# Patient Record
Sex: FEMALE | URBAN - METROPOLITAN AREA
[De-identification: names, ages, dates, MRNs, and addresses within clinical notes are randomized per-mention and may not be internally consistent; named-entity substitution may affect disease eponyms.]

---

## 2017-05-30 ENCOUNTER — SNF ADMIT/H&P (OUTPATIENT)
Dept: FAMILY MEDICINE CLINIC | Facility: CLINIC | Age: 82
End: 2017-05-30

## 2017-05-30 DIAGNOSIS — Z95.1 HX OF CABG: ICD-10-CM

## 2017-05-30 DIAGNOSIS — I21.4 NSTEMI (NON-ST ELEVATED MYOCARDIAL INFARCTION) (HCC): ICD-10-CM

## 2017-05-30 DIAGNOSIS — E78.00 PURE HYPERCHOLESTEROLEMIA: ICD-10-CM

## 2017-05-30 DIAGNOSIS — I25.119 CORONARY ARTERY DISEASE INVOLVING NATIVE HEART WITH ANGINA PECTORIS, UNSPECIFIED VESSEL OR LESION TYPE (HCC): ICD-10-CM

## 2017-05-30 DIAGNOSIS — J44.1 COPD EXACERBATION (HCC): Primary | ICD-10-CM

## 2017-05-30 DIAGNOSIS — E11.59 TYPE 2 DIABETES MELLITUS WITH OTHER CIRCULATORY COMPLICATION, WITHOUT LONG-TERM CURRENT USE OF INSULIN (HCC): ICD-10-CM

## 2017-05-30 DIAGNOSIS — M54.50 CHRONIC LOW BACK PAIN WITHOUT SCIATICA, UNSPECIFIED BACK PAIN LATERALITY: ICD-10-CM

## 2017-05-30 DIAGNOSIS — G89.29 CHRONIC LOW BACK PAIN WITHOUT SCIATICA, UNSPECIFIED BACK PAIN LATERALITY: ICD-10-CM

## 2017-05-30 DIAGNOSIS — I10 ESSENTIAL HYPERTENSION: ICD-10-CM

## 2017-05-30 PROCEDURE — 99306 1ST NF CARE HIGH MDM 50: CPT | Performed by: FAMILY MEDICINE

## 2017-06-01 VITALS
DIASTOLIC BLOOD PRESSURE: 83 MMHG | TEMPERATURE: 97 F | SYSTOLIC BLOOD PRESSURE: 109 MMHG | WEIGHT: 190 LBS | HEART RATE: 97 BPM | OXYGEN SATURATION: 97 % | RESPIRATION RATE: 16 BRPM

## 2017-06-01 RX ORDER — ANTACID TABLETS 500 MG/1
1 TABLET, CHEWABLE ORAL DAILY
Qty: 30 TABLET | Refills: 0 | COMMUNITY
Start: 2017-06-01 | End: 2017-07-01

## 2017-06-01 RX ORDER — ASPIRIN 81 MG
100 TABLET, DELAYED RELEASE (ENTERIC COATED) ORAL 2 TIMES DAILY
Qty: 60 TABLET | Refills: 0 | COMMUNITY
Start: 2017-06-01 | End: 2020-08-03

## 2017-06-01 RX ORDER — CITALOPRAM 20 MG/1
10 TABLET ORAL DAILY
Qty: 30 TABLET | Refills: 0 | COMMUNITY
Start: 2017-06-01

## 2017-06-01 RX ORDER — CEPHALEXIN 500 MG/1
500 CAPSULE ORAL 3 TIMES DAILY
Qty: 15 CAPSULE | Refills: 0 | COMMUNITY
Start: 2017-06-01 | End: 2019-05-14 | Stop reason: ALTCHOICE

## 2017-06-01 RX ORDER — IPRATROPIUM BROMIDE AND ALBUTEROL SULFATE 2.5; .5 MG/3ML; MG/3ML
3 SOLUTION RESPIRATORY (INHALATION) EVERY 6 HOURS PRN
Qty: 1 CONTAINER | Refills: 0 | COMMUNITY
Start: 2017-06-01 | End: 2020-08-03

## 2017-06-01 RX ORDER — CLOPIDOGREL BISULFATE 75 MG/1
75 TABLET ORAL DAILY
Qty: 30 TABLET | Refills: 0 | COMMUNITY
Start: 2017-06-01

## 2017-06-01 RX ORDER — LOSARTAN POTASSIUM 25 MG/1
25 TABLET ORAL DAILY
Qty: 30 TABLET | Refills: 0 | COMMUNITY
Start: 2017-06-01 | End: 2019-05-14

## 2017-06-01 RX ORDER — ROPINIROLE 1 MG/1
1 TABLET, FILM COATED ORAL 2 TIMES DAILY
Qty: 60 TABLET | Refills: 0 | Status: SHIPPED | OUTPATIENT
Start: 2017-06-01

## 2017-06-01 RX ORDER — PANTOPRAZOLE SODIUM 40 MG/1
40 TABLET, DELAYED RELEASE ORAL
Qty: 30 TABLET | Refills: 0 | COMMUNITY
Start: 2017-06-01

## 2017-06-01 RX ORDER — HYDROXYZINE HYDROCHLORIDE 25 MG/1
25 TABLET, FILM COATED ORAL EVERY 8 HOURS PRN
Qty: 30 TABLET | Refills: 0 | COMMUNITY
Start: 2017-06-01

## 2017-06-02 NOTE — PROGRESS NOTES
Patient was seen today in follow-up for her COPD. She states she is feeling well and that the nurses stated that she did not need to use oxygen at this time.   However during assessment she was 92% on room air sitting at rest when she gets up to walk she d and agreed to a trial of Restoril 7.5 mg nightly as needed. Will reevaluate that on Monday.     Benja Eaton, 06/02/2017, 3:17 PM

## 2017-06-05 ENCOUNTER — SNF VISIT (OUTPATIENT)
Dept: INTERNAL MEDICINE CLINIC | Age: 82
End: 2017-06-05

## 2017-06-05 VITALS
OXYGEN SATURATION: 94 % | SYSTOLIC BLOOD PRESSURE: 120 MMHG | HEART RATE: 80 BPM | DIASTOLIC BLOOD PRESSURE: 59 MMHG | RESPIRATION RATE: 18 BRPM | TEMPERATURE: 98 F

## 2017-06-05 DIAGNOSIS — J44.1 CHRONIC OBSTRUCTIVE PULMONARY DISEASE WITH ACUTE EXACERBATION (HCC): Primary | ICD-10-CM

## 2017-06-05 DIAGNOSIS — R53.81 PHYSICAL DECONDITIONING: ICD-10-CM

## 2017-06-05 PROCEDURE — 99309 SBSQ NF CARE MODERATE MDM 30: CPT | Performed by: NURSE PRACTITIONER

## 2017-06-05 NOTE — PROGRESS NOTES
Patient was seen today in follow-up for her COPD. Requires constant reminders in order to wear her oxygen while she has been walking. Otherwise she states she feels well no shortness of breath no dyspnea no chest pain pressure.   She states she is sleepin

## 2017-06-07 NOTE — PROGRESS NOTES
Olga Lares, 7/17/1935, [de-identified]year old, female is being discharged from Facility: [address]    Date of Admission: 05/31/2017    Date of Discharge:06/8/2017                             Admitting Diagnoses: COPD, diabetes, JAZZY, In hyperlipidemia with an elevated cholesterol and would like to start her on a statin.   She however declines use of a statin stating that it has caused severe caused severe musculoskeletal pain for her in the past and  she is working with her cardiologist in

## 2017-06-08 NOTE — PROGRESS NOTES
Garcia Senior  : 1935  Age [de-identified]year old  female patient is admitted to [address] [place] [address] for weakness, COPD    Chief complaint: shortness of breath    HPI  [de-identified] yo female with PMHx of CAD s/p CABG, COPD, JAZZY, DMII, HTN, HLD 3 (three) times daily. , Disp: 15 capsule, Rfl: 0  MetFORMIN HCl 500 MG Oral Tab, Take 1 tablet (500 mg total) by mouth 2 (two) times daily. , Disp: 180 tablet, Rfl: 3  metoprolol Tartrate 25 MG Oral Tab, Take 0.5 tablets (12.5 mg total) by mouth 2 (two) susanna rivaroxaban 10 MG Oral Tab, Take 10 mg by mouth daily with food. , Disp: , Rfl:   ondansetron 4 MG Oral Tablet Dispersible, Take 4 mg by mouth every 8 (eight) hours as needed for Nausea., Disp: , Rfl:         VITALS:  /83 mmHg  Pulse 97  Temp(Src) 9 appropriate      MEDICAL DECISION MAKING    [de-identified] yo female with PMHx of CAD s/p CABG, COPD, JAZZY, DMII, HTN, HLD, lumbar back pain s/p minimally invasive spinal surgery. She had back pain and then had minimally invasive spinal surgery.   The next day she deve

## 2018-02-16 ENCOUNTER — CHARTING TRANS (OUTPATIENT)
Dept: OTHER | Age: 83
End: 2018-02-16

## 2018-02-19 ENCOUNTER — CHARTING TRANS (OUTPATIENT)
Dept: OTHER | Age: 83
End: 2018-02-19

## 2018-02-19 ENCOUNTER — LAB SERVICES (OUTPATIENT)
Dept: OTHER | Age: 83
End: 2018-02-19

## 2018-02-19 ENCOUNTER — IMAGING SERVICES (OUTPATIENT)
Dept: OTHER | Age: 83
End: 2018-02-19

## 2018-02-19 LAB
C3 SERPL-MCNC: 139 MG/DL (ref 88–165)
C4 SERPL-MCNC: 34.5 MG/DL (ref 14–44)
CRP SERPL-MCNC: 0.9 MG/DL (ref 0–1)
RHEUMATOID FACT SERPL-ACNC: <8.6 [IU]/ML
SEDIMENTATION RATE, RBC: 17 MM/H (ref 0–20)

## 2018-02-20 LAB
ANA SER QL IA: NORMAL RATIO (ref 0–0.6)
DSDNA IGG SERPL IA-ACNC: NORMAL [IU]/ML (ref 0–10)

## 2018-03-19 ENCOUNTER — CHARTING TRANS (OUTPATIENT)
Dept: OTHER | Age: 83
End: 2018-03-19

## 2018-11-23 ENCOUNTER — IMAGING SERVICES (OUTPATIENT)
Dept: OTHER | Age: 83
End: 2018-11-23

## 2019-02-09 ENCOUNTER — EXTERNAL RECORD (OUTPATIENT)
Dept: OTHER | Age: 84
End: 2019-02-09

## 2019-03-06 VITALS
DIASTOLIC BLOOD PRESSURE: 80 MMHG | WEIGHT: 184 LBS | BODY MASS INDEX: 29.57 KG/M2 | SYSTOLIC BLOOD PRESSURE: 120 MMHG | HEART RATE: 66 BPM | HEIGHT: 66 IN | RESPIRATION RATE: 18 BRPM

## 2019-03-06 VITALS
BODY MASS INDEX: 30.53 KG/M2 | RESPIRATION RATE: 16 BRPM | SYSTOLIC BLOOD PRESSURE: 118 MMHG | HEART RATE: 68 BPM | DIASTOLIC BLOOD PRESSURE: 70 MMHG | WEIGHT: 190 LBS | HEIGHT: 66 IN

## 2019-03-15 LAB
ANION GAP: 9
BASOPHILS: 0.1 X10
BASOPHILS: 1 %
BUN/CREATININE RATIO: 20
BUN: 16 MG/DL
CALCIUM: 10.1 MG/DL
CHLORIDE: 106 MMOL/L
CO2: 23 MMOL/L
CREATININE: 0.8 MG/DL
EGFR AFRICAN-AMERICAN: 79.6
EOSINOPHILS: 0.2 X10
GLUCOSE: 72 MG/DL
HEMATOCRIT: 36.5 %
HEMOGLOBIN: 12.1 G/DL
INR (RUSH): 1
LYMPHOCYTES: 3.3 X10
MCH: 29.4 PG
MCHC: 33.1 G/DL
MCV: 89 FL
MONOCYTES: 0.8 X10
MONOCYTES: 9 %
MPV: 8.6 FL
NEUTROPHILS: 4.6 X10
PARTIAL THROMBOPLASTIN TIME: 26.9 SECONDS
PLATELET COUNT: 266 X10
POTASSIUM: 4.6 MMOL/L
PROTHROMBIN TIME: 10.5 SECONDS
RDW: 13.8 %
RED BLOOD CELL COUNT: 4.1 X10
SODIUM: 138 MMOL/L
WHITE BLOOD CELL COUNT: 9 X10

## 2019-05-03 ENCOUNTER — NURSE ONLY (OUTPATIENT)
Dept: LAB | Age: 84
End: 2019-05-03
Attending: FAMILY MEDICINE

## 2019-05-03 ENCOUNTER — INITIAL APN SNF VISIT (OUTPATIENT)
Dept: INTERNAL MEDICINE CLINIC | Age: 84
End: 2019-05-03

## 2019-05-03 VITALS
DIASTOLIC BLOOD PRESSURE: 65 MMHG | OXYGEN SATURATION: 91 % | RESPIRATION RATE: 18 BRPM | TEMPERATURE: 97 F | SYSTOLIC BLOOD PRESSURE: 114 MMHG | WEIGHT: 188 LBS | HEART RATE: 92 BPM

## 2019-05-03 DIAGNOSIS — I25.810 CORONARY ARTERY DISEASE INVOLVING CORONARY BYPASS GRAFT OF NATIVE HEART, ANGINA PRESENCE UNSPECIFIED: ICD-10-CM

## 2019-05-03 DIAGNOSIS — J44.9 CHRONIC OBSTRUCTIVE PULMONARY DISEASE, UNSPECIFIED COPD TYPE (HCC): ICD-10-CM

## 2019-05-03 DIAGNOSIS — I10 ESSENTIAL HYPERTENSION: ICD-10-CM

## 2019-05-03 DIAGNOSIS — I25.10 CAD (CORONARY ARTERY DISEASE): Primary | ICD-10-CM

## 2019-05-03 DIAGNOSIS — Z99.89 OSA ON CPAP: ICD-10-CM

## 2019-05-03 DIAGNOSIS — E11.65 CONTROLLED TYPE 2 DIABETES MELLITUS WITH HYPERGLYCEMIA, WITH LONG-TERM CURRENT USE OF INSULIN (HCC): ICD-10-CM

## 2019-05-03 DIAGNOSIS — E78.5 HLD (HYPERLIPIDEMIA): ICD-10-CM

## 2019-05-03 DIAGNOSIS — E78.5 HYPERLIPIDEMIA, UNSPECIFIED HYPERLIPIDEMIA TYPE: ICD-10-CM

## 2019-05-03 DIAGNOSIS — G47.33 OSA ON CPAP: ICD-10-CM

## 2019-05-03 DIAGNOSIS — I10 HTN (HYPERTENSION): ICD-10-CM

## 2019-05-03 DIAGNOSIS — F32.A DEPRESSION, UNSPECIFIED DEPRESSION TYPE: ICD-10-CM

## 2019-05-03 DIAGNOSIS — Z79.4 CONTROLLED TYPE 2 DIABETES MELLITUS WITH HYPERGLYCEMIA, WITH LONG-TERM CURRENT USE OF INSULIN (HCC): ICD-10-CM

## 2019-05-03 DIAGNOSIS — Z96.641 STATUS POST RIGHT HIP REPLACEMENT: Primary | ICD-10-CM

## 2019-05-03 DIAGNOSIS — H51.8 DVD (DISSOCIATED VERTICAL DEVIATION): ICD-10-CM

## 2019-05-03 DIAGNOSIS — I70.218 CLAUDICATION OF BOTH UPPER EXTREMITIES DUE TO ATHEROSCLEROSIS (HCC): ICD-10-CM

## 2019-05-03 PROCEDURE — 82306 VITAMIN D 25 HYDROXY: CPT

## 2019-05-03 PROCEDURE — 83735 ASSAY OF MAGNESIUM: CPT

## 2019-05-03 PROCEDURE — 80053 COMPREHEN METABOLIC PANEL: CPT

## 2019-05-03 PROCEDURE — 99309 SBSQ NF CARE MODERATE MDM 30: CPT | Performed by: NURSE PRACTITIONER

## 2019-05-03 PROCEDURE — 85025 COMPLETE CBC W/AUTO DIFF WBC: CPT

## 2019-05-03 RX ORDER — FUROSEMIDE 20 MG/1
20 TABLET ORAL DAILY
COMMUNITY

## 2019-05-03 RX ORDER — ISOSORBIDE MONONITRATE 30 MG/1
60 TABLET, EXTENDED RELEASE ORAL DAILY
COMMUNITY

## 2019-05-03 RX ORDER — ASPIRIN 325 MG
325 TABLET ORAL DAILY
COMMUNITY

## 2019-05-03 NOTE — PROGRESS NOTES
Mayi Alberto  : 1935  Age 81year old  female patient is admitted to Facility: [facility] [place] [facility] for   [address] and medical management.     [address] [facility] date:  19  Discharge date to [facility]:  19  CARLOS:  Caryl Ibrahim ipratropium-albuterol (DUONEB) 0.5-2.5 (3) MG/3ML Inhalation Solution Take 3 mL by nebulization every 6 (six) hours as needed. Disp: 1 Container Rfl: 0   Citalopram Hydrobromide (CELEXA) 20 MG Oral Tab Take 1 tablet (20 mg total) by mouth daily.  Disp: 30 Losartan Potassium 25 MG Oral Tab Take 1 tablet (25 mg total) by mouth daily. Disp: 30 tablet Rfl: 0   docusate sodium 100 MG Oral Tab Take 1 tablet (100 mg total) by mouth 2 (two) times daily.  Disp: 60 tablet Rfl: 0   insulin glargine (LANTUS) 100 UNIT/ warm, dry  WOUND: right hip surgical dressing  EYES: PERRLA, EOMI, sclera anicteric, conjunctiva normal; there is no nystagmus, no drainage from eyes  HENT: normocephalic; normal nose, no nasal drainage, mucous membranes pink, moist.  NECK: supple, non ten gait disturbance and weakness  - PT/OT eval and treat  - ELOS 10-14 days  - DC TBD, DW rehab abd SW  - Pain management with Norco 5 or 7.5 q4 prn, tylenol mild pain fevers  - bowel management Miralax 17 gm daily prn, senna 2 tabs BID prn  - AC with Aspirin

## 2019-05-06 ENCOUNTER — SNF VISIT (OUTPATIENT)
Dept: INTERNAL MEDICINE CLINIC | Age: 84
End: 2019-05-06

## 2019-05-06 ENCOUNTER — NURSE ONLY (OUTPATIENT)
Dept: LAB | Age: 84
End: 2019-05-06
Attending: FAMILY MEDICINE

## 2019-05-06 VITALS
TEMPERATURE: 97 F | RESPIRATION RATE: 19 BRPM | DIASTOLIC BLOOD PRESSURE: 63 MMHG | SYSTOLIC BLOOD PRESSURE: 111 MMHG | HEART RATE: 75 BPM | OXYGEN SATURATION: 95 %

## 2019-05-06 DIAGNOSIS — I10 ESSENTIAL HYPERTENSION: ICD-10-CM

## 2019-05-06 DIAGNOSIS — Z79.4 CONTROLLED TYPE 2 DIABETES MELLITUS WITH HYPERGLYCEMIA, WITH LONG-TERM CURRENT USE OF INSULIN (HCC): ICD-10-CM

## 2019-05-06 DIAGNOSIS — Z96.641 STATUS POST RIGHT HIP REPLACEMENT: Primary | ICD-10-CM

## 2019-05-06 DIAGNOSIS — Z99.89 OSA ON CPAP: ICD-10-CM

## 2019-05-06 DIAGNOSIS — E11.65 CONTROLLED TYPE 2 DIABETES MELLITUS WITH HYPERGLYCEMIA, WITH LONG-TERM CURRENT USE OF INSULIN (HCC): ICD-10-CM

## 2019-05-06 DIAGNOSIS — G47.33 OSA ON CPAP: ICD-10-CM

## 2019-05-06 DIAGNOSIS — E56.9 VITAMIN DISEASE: Primary | ICD-10-CM

## 2019-05-06 DIAGNOSIS — I25.810 CORONARY ARTERY DISEASE INVOLVING CORONARY BYPASS GRAFT OF NATIVE HEART, ANGINA PRESENCE UNSPECIFIED: ICD-10-CM

## 2019-05-06 DIAGNOSIS — E78.5 HYPERLIPIDEMIA, UNSPECIFIED HYPERLIPIDEMIA TYPE: ICD-10-CM

## 2019-05-06 DIAGNOSIS — J44.9 CHRONIC OBSTRUCTIVE PULMONARY DISEASE, UNSPECIFIED COPD TYPE (HCC): ICD-10-CM

## 2019-05-06 PROCEDURE — 80053 COMPREHEN METABOLIC PANEL: CPT

## 2019-05-06 PROCEDURE — 85025 COMPLETE CBC W/AUTO DIFF WBC: CPT

## 2019-05-06 PROCEDURE — 99308 SBSQ NF CARE LOW MDM 20: CPT | Performed by: NURSE PRACTITIONER

## 2019-05-06 NOTE — PROGRESS NOTES
Mira Garcia, 7/17/1935, 81year old, female    Chief Complaint:  Patient presents with:   Follow - Up: s/p Right THR  Weakness  Pain  Lab Results       Subjective:      Patient is an 81year old female with previous medical history including JAZZY on CPA appropriate to situation    Therapy update  Ambulating 200 FT with RW and SBA    Medications reviewed: Yes    Diagnostics reviewed:      Lab Results   Component Value Date    WBC 6.9 05/06/2019    RBC 3.22 (L) 05/06/2019    HGB 9.4 (L) 05/06/2019    HCT 29 CMP weekly     Follow Ups:  PCP within 7 days of DC  Ortho in 3 weeks    Dhaval Napoles, APRN  5/6/2019

## 2019-05-08 ENCOUNTER — EXTERNAL FACILITY (OUTPATIENT)
Dept: FAMILY MEDICINE CLINIC | Facility: CLINIC | Age: 84
End: 2019-05-08

## 2019-05-08 DIAGNOSIS — J44.9 CHRONIC OBSTRUCTIVE PULMONARY DISEASE, UNSPECIFIED COPD TYPE (HCC): ICD-10-CM

## 2019-05-08 DIAGNOSIS — G47.33 OSA ON CPAP: ICD-10-CM

## 2019-05-08 DIAGNOSIS — Z79.4 CONTROLLED TYPE 2 DIABETES MELLITUS WITH HYPERGLYCEMIA, WITH LONG-TERM CURRENT USE OF INSULIN (HCC): ICD-10-CM

## 2019-05-08 DIAGNOSIS — I10 ESSENTIAL HYPERTENSION: ICD-10-CM

## 2019-05-08 DIAGNOSIS — Z99.89 OSA ON CPAP: ICD-10-CM

## 2019-05-08 DIAGNOSIS — E11.65 CONTROLLED TYPE 2 DIABETES MELLITUS WITH HYPERGLYCEMIA, WITH LONG-TERM CURRENT USE OF INSULIN (HCC): ICD-10-CM

## 2019-05-08 DIAGNOSIS — F32.A DEPRESSION, UNSPECIFIED DEPRESSION TYPE: ICD-10-CM

## 2019-05-08 DIAGNOSIS — E78.5 HYPERLIPIDEMIA, UNSPECIFIED HYPERLIPIDEMIA TYPE: ICD-10-CM

## 2019-05-08 DIAGNOSIS — Z96.641 STATUS POST RIGHT HIP REPLACEMENT: Primary | ICD-10-CM

## 2019-05-08 DIAGNOSIS — I25.810 CORONARY ARTERY DISEASE INVOLVING CORONARY BYPASS GRAFT OF NATIVE HEART, ANGINA PRESENCE UNSPECIFIED: ICD-10-CM

## 2019-05-08 DIAGNOSIS — K21.9 GASTROESOPHAGEAL REFLUX DISEASE WITHOUT ESOPHAGITIS: ICD-10-CM

## 2019-05-08 DIAGNOSIS — I70.218 CLAUDICATION OF BOTH UPPER EXTREMITIES DUE TO ATHEROSCLEROSIS (HCC): ICD-10-CM

## 2019-05-08 PROCEDURE — 99306 1ST NF CARE HIGH MDM 50: CPT | Performed by: FAMILY MEDICINE

## 2019-05-09 ENCOUNTER — SNF VISIT (OUTPATIENT)
Dept: INTERNAL MEDICINE CLINIC | Age: 84
End: 2019-05-09

## 2019-05-09 VITALS
HEART RATE: 77 BPM | TEMPERATURE: 97 F | DIASTOLIC BLOOD PRESSURE: 55 MMHG | SYSTOLIC BLOOD PRESSURE: 116 MMHG | OXYGEN SATURATION: 95 % | RESPIRATION RATE: 18 BRPM

## 2019-05-09 DIAGNOSIS — E11.65 CONTROLLED TYPE 2 DIABETES MELLITUS WITH HYPERGLYCEMIA, WITH LONG-TERM CURRENT USE OF INSULIN (HCC): ICD-10-CM

## 2019-05-09 DIAGNOSIS — J44.9 CHRONIC OBSTRUCTIVE PULMONARY DISEASE, UNSPECIFIED COPD TYPE (HCC): ICD-10-CM

## 2019-05-09 DIAGNOSIS — I10 ESSENTIAL HYPERTENSION: ICD-10-CM

## 2019-05-09 DIAGNOSIS — Z96.641 STATUS POST RIGHT HIP REPLACEMENT: Primary | ICD-10-CM

## 2019-05-09 DIAGNOSIS — R53.1 WEAKNESS GENERALIZED: ICD-10-CM

## 2019-05-09 DIAGNOSIS — Z79.4 CONTROLLED TYPE 2 DIABETES MELLITUS WITH HYPERGLYCEMIA, WITH LONG-TERM CURRENT USE OF INSULIN (HCC): ICD-10-CM

## 2019-05-09 DIAGNOSIS — E78.5 HYPERLIPIDEMIA, UNSPECIFIED HYPERLIPIDEMIA TYPE: ICD-10-CM

## 2019-05-09 PROCEDURE — 99308 SBSQ NF CARE LOW MDM 20: CPT | Performed by: NURSE PRACTITIONER

## 2019-05-09 NOTE — PROGRESS NOTES
Digna Robles, 7/17/1935, 81year old, female    Chief Complaint:  Patient presents with:   Follow - Up: right total hip  Pain  Weakness       Subjective:      Patient is an 81year old female with previous medical history including JAZZY on CPAP, DM type reviewed: Yes    Diagnostics reviewed:      Lab Results   Component Value Date    WBC 6.9 05/06/2019    RBC 3.22 (L) 05/06/2019    HGB 9.4 (L) 05/06/2019    HCT 29.7 (L) 05/06/2019    MCV 92.2 05/06/2019    MCH 29.2 05/06/2019    MCHC 31.6 05/06/2019    RD days of DC  Ortho in 3 weeks    Nick Alejandro, APRN  5/9/2019

## 2019-05-12 NOTE — PROGRESS NOTES
[address] [facility] Author: Elvira Forde MD     1935 MRN [id number]   [facility]  Admission        [facility] Discharge   PCP No primary care provider on file.    [facility]         Date of Admission: (PLAVIX) 75 MG Oral Tab Take 1 tablet (75 mg total) by mouth daily. Losartan Potassium 25 MG Oral Tab Take 1 tablet (25 mg total) by mouth daily. docusate sodium 100 MG Oral Tab Take 1 tablet (100 mg total) by mouth 2 (two) times daily.    cephALEXin 50 mouth daily with food. ondansetron 4 MG Oral Tablet Dispersible Take 4 mg by mouth every 8 (eight) hours as needed for Nausea. No current facility-administered medications on file prior to visit.        HISTORY:  She  has no past medical history on fi Lymphadenopathy:     She has no cervical adenopathy. Neurological: She is alert and oriented to person, place, and time. She has normal reflexes. She displays normal reflexes. No cranial nerve deficit. She exhibits normal muscle tone.  Coordination norm Manish Shah MD 5/12/2019, 1:34 PM

## 2019-05-13 ENCOUNTER — NURSE ONLY (OUTPATIENT)
Dept: LAB | Age: 84
End: 2019-05-13
Attending: FAMILY MEDICINE

## 2019-05-13 DIAGNOSIS — E56.9 VITAMIN DISEASE: Primary | ICD-10-CM

## 2019-05-13 PROCEDURE — 80053 COMPREHEN METABOLIC PANEL: CPT

## 2019-05-13 PROCEDURE — 85025 COMPLETE CBC W/AUTO DIFF WBC: CPT

## 2019-05-14 ENCOUNTER — SNF DISCHARGE (OUTPATIENT)
Dept: INTERNAL MEDICINE CLINIC | Age: 84
End: 2019-05-14

## 2019-05-14 VITALS
OXYGEN SATURATION: 94 % | TEMPERATURE: 97 F | SYSTOLIC BLOOD PRESSURE: 126 MMHG | DIASTOLIC BLOOD PRESSURE: 71 MMHG | WEIGHT: 185.5 LBS | RESPIRATION RATE: 18 BRPM | HEART RATE: 73 BPM

## 2019-05-14 DIAGNOSIS — R53.1 WEAKNESS GENERALIZED: ICD-10-CM

## 2019-05-14 DIAGNOSIS — I10 ESSENTIAL HYPERTENSION: ICD-10-CM

## 2019-05-14 DIAGNOSIS — Z96.641 STATUS POST RIGHT HIP REPLACEMENT: Primary | ICD-10-CM

## 2019-05-14 DIAGNOSIS — E11.65 CONTROLLED TYPE 2 DIABETES MELLITUS WITH HYPERGLYCEMIA, WITH LONG-TERM CURRENT USE OF INSULIN (HCC): ICD-10-CM

## 2019-05-14 DIAGNOSIS — E78.5 HYPERLIPIDEMIA, UNSPECIFIED HYPERLIPIDEMIA TYPE: ICD-10-CM

## 2019-05-14 DIAGNOSIS — Z79.4 CONTROLLED TYPE 2 DIABETES MELLITUS WITH HYPERGLYCEMIA, WITH LONG-TERM CURRENT USE OF INSULIN (HCC): ICD-10-CM

## 2019-05-14 DIAGNOSIS — G47.33 OSA ON CPAP: ICD-10-CM

## 2019-05-14 DIAGNOSIS — J44.9 CHRONIC OBSTRUCTIVE PULMONARY DISEASE, UNSPECIFIED COPD TYPE (HCC): ICD-10-CM

## 2019-05-14 DIAGNOSIS — I25.810 CORONARY ARTERY DISEASE INVOLVING CORONARY BYPASS GRAFT OF NATIVE HEART, ANGINA PRESENCE UNSPECIFIED: ICD-10-CM

## 2019-05-14 DIAGNOSIS — Z99.89 OSA ON CPAP: ICD-10-CM

## 2019-05-14 PROCEDURE — 99316 NF DSCHRG MGMT 30 MIN+: CPT | Performed by: NURSE PRACTITIONER

## 2019-05-14 NOTE — PROGRESS NOTES
Nelda Leigh, 7/17/1935, 81year old, female is being discharged from [address] [place] [address] at [address] [facility]    Date of Admission: 5/2/2019    Date of Discharge: 5/14/2019                                 Admitting Diagnoses:  R soft, nondistended; non-tender.   :Deferred  LYMPHATIC:no lymphedema  MUSCULOSKELETAL: no acute synovitis upper or lower extremity  EXTREMITIES/VASCULAR:radial pulses 2+ and dorsalis pedal pulses 2+  NEUROLOGIC:A&OX3, no focal deficits, follow commands  P s/p PCI and CABG  - aspirin 325  - Plavix 75 mg daily  - Cozaar 25 mg po QHS  - imdur 30 mg daily  - furosemide 10 mg daily  - metoprolol 25 mg BID  - Nitro prn  - monitoring vital signs q shift     DM type II  - accucheck BID call for < 70 or >300  - NCS

## 2020-08-03 ENCOUNTER — NURSE ONLY (OUTPATIENT)
Dept: LAB | Age: 85
End: 2020-08-03
Attending: FAMILY MEDICINE

## 2020-08-03 ENCOUNTER — INITIAL APN SNF VISIT (OUTPATIENT)
Dept: INTERNAL MEDICINE CLINIC | Age: 85
End: 2020-08-03

## 2020-08-03 VITALS
OXYGEN SATURATION: 94 % | HEART RATE: 100 BPM | WEIGHT: 185 LBS | TEMPERATURE: 97 F | SYSTOLIC BLOOD PRESSURE: 146 MMHG | RESPIRATION RATE: 18 BRPM | DIASTOLIC BLOOD PRESSURE: 76 MMHG

## 2020-08-03 DIAGNOSIS — I10 ESSENTIAL HYPERTENSION: ICD-10-CM

## 2020-08-03 DIAGNOSIS — R06.02 SHORTNESS OF BREATH: ICD-10-CM

## 2020-08-03 DIAGNOSIS — I25.810 CORONARY ARTERY DISEASE INVOLVING CORONARY BYPASS GRAFT OF NATIVE HEART, ANGINA PRESENCE UNSPECIFIED: ICD-10-CM

## 2020-08-03 DIAGNOSIS — E11.65 CONTROLLED TYPE 2 DIABETES MELLITUS WITH HYPERGLYCEMIA, WITH LONG-TERM CURRENT USE OF INSULIN (HCC): ICD-10-CM

## 2020-08-03 DIAGNOSIS — R52 GENERALIZED PAIN: ICD-10-CM

## 2020-08-03 DIAGNOSIS — E78.5 HYPERLIPIDEMIA, UNSPECIFIED HYPERLIPIDEMIA TYPE: ICD-10-CM

## 2020-08-03 DIAGNOSIS — R53.1 WEAKNESS GENERALIZED: ICD-10-CM

## 2020-08-03 DIAGNOSIS — Z79.4 CONTROLLED TYPE 2 DIABETES MELLITUS WITH HYPERGLYCEMIA, WITH LONG-TERM CURRENT USE OF INSULIN (HCC): ICD-10-CM

## 2020-08-03 DIAGNOSIS — Z96.642 STATUS POST LEFT HIP REPLACEMENT: Primary | ICD-10-CM

## 2020-08-03 DIAGNOSIS — J44.9 CHRONIC OBSTRUCTIVE PULMONARY DISEASE, UNSPECIFIED COPD TYPE (HCC): ICD-10-CM

## 2020-08-03 DIAGNOSIS — Z74.09 IMPAIRED MOBILITY AND ADLS: ICD-10-CM

## 2020-08-03 DIAGNOSIS — G47.33 OSA ON CPAP: ICD-10-CM

## 2020-08-03 DIAGNOSIS — Z99.89 OSA ON CPAP: ICD-10-CM

## 2020-08-03 DIAGNOSIS — R32 UNSPECIFIED URINARY INCONTINENCE: Primary | ICD-10-CM

## 2020-08-03 DIAGNOSIS — Z78.9 IMPAIRED MOBILITY AND ADLS: ICD-10-CM

## 2020-08-03 LAB
ALBUMIN SERPL-MCNC: 2.5 G/DL (ref 3.4–5)
ALBUMIN/GLOB SERPL: 0.7 {RATIO} (ref 1–2)
ALP LIVER SERPL-CCNC: 40 U/L (ref 55–142)
ALT SERPL-CCNC: 23 U/L (ref 13–56)
ANION GAP SERPL CALC-SCNC: 4 MMOL/L (ref 0–18)
AST SERPL-CCNC: 30 U/L (ref 15–37)
BASOPHILS # BLD AUTO: 0.06 X10(3) UL (ref 0–0.2)
BASOPHILS NFR BLD AUTO: 0.7 %
BILIRUB SERPL-MCNC: 0.5 MG/DL (ref 0.1–2)
BUN BLD-MCNC: 12 MG/DL (ref 7–18)
BUN/CREAT SERPL: 19 (ref 10–20)
CALCIUM BLD-MCNC: 9.3 MG/DL (ref 8.5–10.1)
CHLORIDE SERPL-SCNC: 108 MMOL/L (ref 98–112)
CO2 SERPL-SCNC: 25 MMOL/L (ref 21–32)
CREAT BLD-MCNC: 0.63 MG/DL (ref 0.55–1.02)
DEPRECATED RDW RBC AUTO: 58.5 FL (ref 35.1–46.3)
EOSINOPHIL # BLD AUTO: 0.2 X10(3) UL (ref 0–0.7)
EOSINOPHIL NFR BLD AUTO: 2.3 %
ERYTHROCYTE [DISTWIDTH] IN BLOOD BY AUTOMATED COUNT: 17.2 % (ref 11–15)
GLOBULIN PLAS-MCNC: 3.8 G/DL (ref 2.8–4.4)
GLUCOSE BLD-MCNC: 165 MG/DL (ref 70–99)
HAV IGM SER QL: 1.6 MG/DL (ref 1.6–2.6)
HCT VFR BLD AUTO: 26.1 % (ref 35–48)
HGB BLD-MCNC: 7.8 G/DL (ref 12–16)
IMM GRANULOCYTES # BLD AUTO: 0.04 X10(3) UL (ref 0–1)
IMM GRANULOCYTES NFR BLD: 0.5 %
LYMPHOCYTES # BLD AUTO: 1.32 X10(3) UL (ref 1–4)
LYMPHOCYTES NFR BLD AUTO: 15.4 %
M PROTEIN MFR SERPL ELPH: 6.3 G/DL (ref 6.4–8.2)
MCH RBC QN AUTO: 28 PG (ref 26–34)
MCHC RBC AUTO-ENTMCNC: 29.9 G/DL (ref 31–37)
MCV RBC AUTO: 93.5 FL (ref 80–100)
MONOCYTES # BLD AUTO: 0.75 X10(3) UL (ref 0.1–1)
MONOCYTES NFR BLD AUTO: 8.8 %
NEUTROPHILS # BLD AUTO: 6.2 X10 (3) UL (ref 1.5–7.7)
NEUTROPHILS # BLD AUTO: 6.2 X10(3) UL (ref 1.5–7.7)
NEUTROPHILS NFR BLD AUTO: 72.3 %
OSMOLALITY SERPL CALC.SUM OF ELEC: 287 MOSM/KG (ref 275–295)
PATIENT FASTING Y/N/NP: YES
PLATELET # BLD AUTO: 251 10(3)UL (ref 150–450)
POTASSIUM SERPL-SCNC: 4.3 MMOL/L (ref 3.5–5.1)
RBC # BLD AUTO: 2.79 X10(6)UL (ref 3.8–5.3)
SODIUM SERPL-SCNC: 137 MMOL/L (ref 136–145)
VIT D+METAB SERPL-MCNC: 40.5 NG/ML (ref 30–100)
WBC # BLD AUTO: 8.6 X10(3) UL (ref 4–11)

## 2020-08-03 PROCEDURE — 82306 VITAMIN D 25 HYDROXY: CPT

## 2020-08-03 PROCEDURE — 99309 SBSQ NF CARE MODERATE MDM 30: CPT | Performed by: NURSE PRACTITIONER

## 2020-08-03 PROCEDURE — 80053 COMPREHEN METABOLIC PANEL: CPT

## 2020-08-03 PROCEDURE — 85025 COMPLETE CBC W/AUTO DIFF WBC: CPT

## 2020-08-03 PROCEDURE — 83735 ASSAY OF MAGNESIUM: CPT

## 2020-08-03 RX ORDER — HYDROCODONE BITARTRATE AND ACETAMINOPHEN 7.5; 325 MG/1; MG/1
1-2 TABLET ORAL EVERY 4 HOURS PRN
COMMUNITY

## 2020-08-03 RX ORDER — IPRATROPIUM BROMIDE 21 UG/1
2 SPRAY, METERED NASAL 4 TIMES DAILY
COMMUNITY

## 2020-08-03 RX ORDER — INSULIN LISPRO 100 [IU]/ML
INJECTION, SOLUTION INTRAVENOUS; SUBCUTANEOUS NIGHTLY
COMMUNITY

## 2020-08-03 RX ORDER — INSULIN LISPRO 100 [IU]/ML
INJECTION, SOLUTION INTRAVENOUS; SUBCUTANEOUS
COMMUNITY

## 2020-08-03 NOTE — PROGRESS NOTES
Augustine Iniguez  : 1935  Age 81year old  female patient is admitted to Facility: [facility] for [employer].     [address] [facility] date:  2020  Discharge date to [facility]:  2020  ELOS:  8-9 days  Antici DNR    ADVANCED CARE PLANNING TEAM: None      CURRENT MEDICATIONS   Current Outpatient Medications   Medication Sig Dispense Refill   • HYDROcodone-acetaminophen 7.5-325 MG Oral Tab Take 1-2 tablets by mouth every 4 (four) hours as needed for Pain.      • I by mouth every 8 (eight) hours as needed for Itching. 30 tablet 0   • celecoxib 200 MG Oral Cap Take 200 mg by mouth daily. • Losartan Potassium 25 MG Oral Tab Take 25 mg by mouth daily.      • metoprolol Tartrate 25 MG Oral Tab Take 25 mg by mouth 2 drainage from eyes  HENT: normocephalic; normal nose, no nasal drainage, mucous membranes pink, moist.  NECK: supple, non tender, FROM  BREAST: deferred  RESPIRATORY: Diminished, no crackles, no wheezing, no dyspnea, no cough, O2, 2 L per nasal cannula  CA ADL dysfunction  Follow-up with Dr. Marjorie Dias as tolerated  Knee-high KOREY hose bilaterally daily x3 weeks remove at night  Do not get the incision wet, anterior hip precautions for 3 months  Aspirin 325 p.o. daily for 6 weeks  Iron suppl medication reconciliation and entering orders to establish plan of care in [facility].     Miriam Gunter, APRN  08/03/20   3:37 PM

## 2020-08-04 ENCOUNTER — OFFICE VISIT (OUTPATIENT)
Dept: FAMILY MEDICINE CLINIC | Facility: CLINIC | Age: 85
End: 2020-08-04

## 2020-08-04 DIAGNOSIS — Z79.4 INSULIN DEPENDENT TYPE 2 DIABETES MELLITUS (HCC): ICD-10-CM

## 2020-08-04 DIAGNOSIS — R53.1 GENERALIZED WEAKNESS: ICD-10-CM

## 2020-08-04 DIAGNOSIS — Z96.642 HISTORY OF LEFT HIP REPLACEMENT: Primary | ICD-10-CM

## 2020-08-04 DIAGNOSIS — Z79.4 CONTROLLED TYPE 2 DIABETES MELLITUS WITH HYPERGLYCEMIA, WITH LONG-TERM CURRENT USE OF INSULIN (HCC): ICD-10-CM

## 2020-08-04 DIAGNOSIS — F32.A DEPRESSION, UNSPECIFIED DEPRESSION TYPE: ICD-10-CM

## 2020-08-04 DIAGNOSIS — I70.218 CLAUDICATION OF BOTH UPPER EXTREMITIES DUE TO ATHEROSCLEROSIS (HCC): ICD-10-CM

## 2020-08-04 DIAGNOSIS — I10 ESSENTIAL HYPERTENSION: ICD-10-CM

## 2020-08-04 DIAGNOSIS — E78.5 HYPERLIPIDEMIA, UNSPECIFIED HYPERLIPIDEMIA TYPE: ICD-10-CM

## 2020-08-04 DIAGNOSIS — E11.65 CONTROLLED TYPE 2 DIABETES MELLITUS WITH HYPERGLYCEMIA, WITH LONG-TERM CURRENT USE OF INSULIN (HCC): ICD-10-CM

## 2020-08-04 DIAGNOSIS — Z99.89 OSA ON CPAP: ICD-10-CM

## 2020-08-04 DIAGNOSIS — G25.81 RLS (RESTLESS LEGS SYNDROME): ICD-10-CM

## 2020-08-04 DIAGNOSIS — E11.9 INSULIN DEPENDENT TYPE 2 DIABETES MELLITUS (HCC): ICD-10-CM

## 2020-08-04 DIAGNOSIS — G47.33 OSA ON CPAP: ICD-10-CM

## 2020-08-04 DIAGNOSIS — R53.81 PHYSICAL DECONDITIONING: ICD-10-CM

## 2020-08-04 DIAGNOSIS — K59.00 CONSTIPATION, UNSPECIFIED CONSTIPATION TYPE: ICD-10-CM

## 2020-08-04 DIAGNOSIS — J44.9 CHRONIC OBSTRUCTIVE PULMONARY DISEASE, UNSPECIFIED COPD TYPE (HCC): ICD-10-CM

## 2020-08-04 DIAGNOSIS — I25.810 CORONARY ARTERY DISEASE INVOLVING CORONARY BYPASS GRAFT OF NATIVE HEART, ANGINA PRESENCE UNSPECIFIED: ICD-10-CM

## 2020-08-04 PROCEDURE — 99306 1ST NF CARE HIGH MDM 50: CPT | Performed by: FAMILY MEDICINE

## 2020-08-04 NOTE — PROGRESS NOTES
[address] [facility] Author: Yoni Ronquillo MD     1935 MRN [id number]   [facility]  Admission        [facility] Discharge   PCP No primary care provider on file.    [facility]ospital [facility]         Date of Admission: [id number]  -8 units if blood glucose [id number]  -10 units if blood glucose [id number]  Notify MD if BG <70 mg/dL or BG>400  Insulin Lispro 100 UNIT/ML Subcutaneous Solution, Inject 4-20 Units into the skin 3 (three) times daily before meals. current facility-administered medications on file prior to visit. HISTORY:  She  has no past medical history on file. She  has no past surgical history on file. She family history is not on file. She  reports that she has quit smoking.  She cervical adenopathy. Skin:     General: Skin is warm and dry. Coloration: Skin is not pale. Findings: No erythema or rash. Neurological:      Mental Status: She is alert and oriented to person, place, and time.       Cranial Nerves: No cranial g/dL    HCT 26.1 (L) 35.0 - 48.0 %    .0 150.0 - 450.0 10(3)uL    MCV 93.5 80.0 - 100.0 fL    MCH 28.0 26.0 - 34.0 pg    MCHC 29.9 (L) 31.0 - 37.0 g/dL    RDW 17.2 (H) 11.0 - 15.0 %    RDW-SD 58.5 (H) 35.1 - 46.3 fL    Neutrophil Absolute Prelim 6. 2 recooperation before an eventual transition to home and we will work on her discharge needs.        Elvira Forde MD

## 2020-08-05 ENCOUNTER — SNF VISIT (OUTPATIENT)
Dept: INTERNAL MEDICINE CLINIC | Age: 85
End: 2020-08-05

## 2020-08-05 VITALS
RESPIRATION RATE: 18 BRPM | DIASTOLIC BLOOD PRESSURE: 59 MMHG | TEMPERATURE: 98 F | OXYGEN SATURATION: 98 % | HEART RATE: 82 BPM | WEIGHT: 185 LBS | SYSTOLIC BLOOD PRESSURE: 125 MMHG

## 2020-08-05 DIAGNOSIS — I10 ESSENTIAL HYPERTENSION: ICD-10-CM

## 2020-08-05 DIAGNOSIS — Z79.4 CONTROLLED TYPE 2 DIABETES MELLITUS WITH HYPERGLYCEMIA, WITH LONG-TERM CURRENT USE OF INSULIN (HCC): ICD-10-CM

## 2020-08-05 DIAGNOSIS — G47.33 OSA ON CPAP: ICD-10-CM

## 2020-08-05 DIAGNOSIS — Z74.09 IMPAIRED MOBILITY AND ADLS: ICD-10-CM

## 2020-08-05 DIAGNOSIS — I25.810 CORONARY ARTERY DISEASE INVOLVING CORONARY BYPASS GRAFT OF NATIVE HEART, ANGINA PRESENCE UNSPECIFIED: ICD-10-CM

## 2020-08-05 DIAGNOSIS — Z78.9 IMPAIRED MOBILITY AND ADLS: ICD-10-CM

## 2020-08-05 DIAGNOSIS — E11.65 CONTROLLED TYPE 2 DIABETES MELLITUS WITH HYPERGLYCEMIA, WITH LONG-TERM CURRENT USE OF INSULIN (HCC): ICD-10-CM

## 2020-08-05 DIAGNOSIS — R53.1 WEAKNESS GENERALIZED: ICD-10-CM

## 2020-08-05 DIAGNOSIS — Z96.642 STATUS POST LEFT HIP REPLACEMENT: Primary | ICD-10-CM

## 2020-08-05 DIAGNOSIS — J44.9 CHRONIC OBSTRUCTIVE PULMONARY DISEASE, UNSPECIFIED COPD TYPE (HCC): ICD-10-CM

## 2020-08-05 DIAGNOSIS — K21.9 GASTROESOPHAGEAL REFLUX DISEASE, ESOPHAGITIS PRESENCE NOT SPECIFIED: ICD-10-CM

## 2020-08-05 DIAGNOSIS — Z99.89 OSA ON CPAP: ICD-10-CM

## 2020-08-05 PROCEDURE — 99309 SBSQ NF CARE MODERATE MDM 30: CPT | Performed by: NURSE PRACTITIONER

## 2020-08-05 NOTE — PROGRESS NOTES
Pavel Davis, 7/17/1935, 81year old, female    Chief Complaint:  Patient presents with:   Follow - Up: s/p LISSA, OA, CAD, DM  Lab Results  Weakness  Pain       Subjective:    HPI:  Javier Botello is an 81year old female with previous medical history including assist  Ambulation 60 ft RW, CGA    Medications reviewed: Yes    Diagnostics reviewed:    Lab Results   Component Value Date    WBC 8.6 08/03/2020    RBC 2.79 (L) 08/03/2020    HGB 7.8 (L) 08/03/2020    HCT 26.1 (L) 08/03/2020    MCV 93.5 08/03/2020    MCH daily  Vital signs every shift and PRN  Blood pressure and pulse controlled     Diabetes mellitus type 2, controlled  Metformin 1000 mg every morning  Novolin N 40 units nightly, add 10 units am and monitor  Humalog sliding scale 3 times daily with separat

## 2020-08-06 ENCOUNTER — NURSE ONLY (OUTPATIENT)
Dept: LAB | Age: 85
End: 2020-08-06
Attending: FAMILY MEDICINE

## 2020-08-06 DIAGNOSIS — Z47.1 AFTERCARE FOLLOWING JOINT REPLACEMENT: Primary | ICD-10-CM

## 2020-08-06 LAB
ANION GAP SERPL CALC-SCNC: 4 MMOL/L (ref 0–18)
BASOPHILS # BLD AUTO: 0.05 X10(3) UL (ref 0–0.2)
BASOPHILS NFR BLD AUTO: 0.7 %
BUN BLD-MCNC: 12 MG/DL (ref 7–18)
BUN/CREAT SERPL: 15.6 (ref 10–20)
CALCIUM BLD-MCNC: 9.7 MG/DL (ref 8.5–10.1)
CHLORIDE SERPL-SCNC: 107 MMOL/L (ref 98–112)
CO2 SERPL-SCNC: 29 MMOL/L (ref 21–32)
CREAT BLD-MCNC: 0.77 MG/DL (ref 0.55–1.02)
DEPRECATED RDW RBC AUTO: 57.3 FL (ref 35.1–46.3)
EOSINOPHIL # BLD AUTO: 0.31 X10(3) UL (ref 0–0.7)
EOSINOPHIL NFR BLD AUTO: 4.3 %
ERYTHROCYTE [DISTWIDTH] IN BLOOD BY AUTOMATED COUNT: 16.9 % (ref 11–15)
GLUCOSE BLD-MCNC: 88 MG/DL (ref 70–99)
HCT VFR BLD AUTO: 27.1 % (ref 35–48)
HGB BLD-MCNC: 8.3 G/DL (ref 12–16)
IMM GRANULOCYTES # BLD AUTO: 0.05 X10(3) UL (ref 0–1)
IMM GRANULOCYTES NFR BLD: 0.7 %
LYMPHOCYTES # BLD AUTO: 2.6 X10(3) UL (ref 1–4)
LYMPHOCYTES NFR BLD AUTO: 35.8 %
MCH RBC QN AUTO: 28.7 PG (ref 26–34)
MCHC RBC AUTO-ENTMCNC: 30.6 G/DL (ref 31–37)
MCV RBC AUTO: 93.8 FL (ref 80–100)
MONOCYTES # BLD AUTO: 0.75 X10(3) UL (ref 0.1–1)
MONOCYTES NFR BLD AUTO: 10.3 %
NEUTROPHILS # BLD AUTO: 3.5 X10 (3) UL (ref 1.5–7.7)
NEUTROPHILS # BLD AUTO: 3.5 X10(3) UL (ref 1.5–7.7)
NEUTROPHILS NFR BLD AUTO: 48.2 %
OSMOLALITY SERPL CALC.SUM OF ELEC: 289 MOSM/KG (ref 275–295)
PATIENT FASTING Y/N/NP: YES
PLATELET # BLD AUTO: 319 10(3)UL (ref 150–450)
POTASSIUM SERPL-SCNC: 3.9 MMOL/L (ref 3.5–5.1)
RBC # BLD AUTO: 2.89 X10(6)UL (ref 3.8–5.3)
SODIUM SERPL-SCNC: 140 MMOL/L (ref 136–145)
WBC # BLD AUTO: 7.3 X10(3) UL (ref 4–11)

## 2020-08-06 PROCEDURE — 80048 BASIC METABOLIC PNL TOTAL CA: CPT

## 2020-08-06 PROCEDURE — 85025 COMPLETE CBC W/AUTO DIFF WBC: CPT

## 2020-08-10 ENCOUNTER — SNF DISCHARGE (OUTPATIENT)
Dept: INTERNAL MEDICINE CLINIC | Age: 85
End: 2020-08-10

## 2020-08-10 DIAGNOSIS — Z74.09 IMPAIRED MOBILITY AND ADLS: ICD-10-CM

## 2020-08-10 DIAGNOSIS — Z96.642 STATUS POST LEFT HIP REPLACEMENT: Primary | ICD-10-CM

## 2020-08-10 DIAGNOSIS — I25.810 CORONARY ARTERY DISEASE INVOLVING CORONARY BYPASS GRAFT OF NATIVE HEART, ANGINA PRESENCE UNSPECIFIED: ICD-10-CM

## 2020-08-10 DIAGNOSIS — J96.11 CHRONIC RESPIRATORY FAILURE WITH HYPOXIA (HCC): ICD-10-CM

## 2020-08-10 DIAGNOSIS — J44.9 CHRONIC OBSTRUCTIVE PULMONARY DISEASE, UNSPECIFIED COPD TYPE (HCC): ICD-10-CM

## 2020-08-10 DIAGNOSIS — E11.65 CONTROLLED TYPE 2 DIABETES MELLITUS WITH HYPERGLYCEMIA, WITH LONG-TERM CURRENT USE OF INSULIN (HCC): ICD-10-CM

## 2020-08-10 DIAGNOSIS — Z79.4 CONTROLLED TYPE 2 DIABETES MELLITUS WITH HYPERGLYCEMIA, WITH LONG-TERM CURRENT USE OF INSULIN (HCC): ICD-10-CM

## 2020-08-10 DIAGNOSIS — I10 ESSENTIAL HYPERTENSION: ICD-10-CM

## 2020-08-10 DIAGNOSIS — Z78.9 IMPAIRED MOBILITY AND ADLS: ICD-10-CM

## 2020-08-10 PROCEDURE — 99316 NF DSCHRG MGMT 30 MIN+: CPT | Performed by: NURSE PRACTITIONER

## 2020-08-11 ENCOUNTER — EXTERNAL FACILITY (OUTPATIENT)
Dept: FAMILY MEDICINE CLINIC | Facility: CLINIC | Age: 85
End: 2020-08-11

## 2020-08-11 VITALS
HEART RATE: 80 BPM | TEMPERATURE: 97 F | RESPIRATION RATE: 18 BRPM | OXYGEN SATURATION: 97 % | DIASTOLIC BLOOD PRESSURE: 66 MMHG | SYSTOLIC BLOOD PRESSURE: 115 MMHG

## 2020-08-11 DIAGNOSIS — G47.33 OSA ON CPAP: ICD-10-CM

## 2020-08-11 DIAGNOSIS — K59.00 CONSTIPATION, UNSPECIFIED CONSTIPATION TYPE: ICD-10-CM

## 2020-08-11 DIAGNOSIS — J44.9 CHRONIC OBSTRUCTIVE PULMONARY DISEASE, UNSPECIFIED COPD TYPE (HCC): ICD-10-CM

## 2020-08-11 DIAGNOSIS — I70.218 CLAUDICATION OF BOTH UPPER EXTREMITIES DUE TO ATHEROSCLEROSIS (HCC): ICD-10-CM

## 2020-08-11 DIAGNOSIS — I10 ESSENTIAL HYPERTENSION: ICD-10-CM

## 2020-08-11 DIAGNOSIS — E78.5 HYPERLIPIDEMIA, UNSPECIFIED HYPERLIPIDEMIA TYPE: ICD-10-CM

## 2020-08-11 DIAGNOSIS — Z99.89 OSA ON CPAP: ICD-10-CM

## 2020-08-11 DIAGNOSIS — Z96.642 HISTORY OF LEFT HIP REPLACEMENT: Primary | ICD-10-CM

## 2020-08-11 DIAGNOSIS — Z79.4 INSULIN DEPENDENT TYPE 2 DIABETES MELLITUS (HCC): ICD-10-CM

## 2020-08-11 DIAGNOSIS — G25.81 RLS (RESTLESS LEGS SYNDROME): ICD-10-CM

## 2020-08-11 DIAGNOSIS — F32.A DEPRESSION, UNSPECIFIED DEPRESSION TYPE: ICD-10-CM

## 2020-08-11 DIAGNOSIS — R53.81 PHYSICAL DECONDITIONING: ICD-10-CM

## 2020-08-11 DIAGNOSIS — E11.9 INSULIN DEPENDENT TYPE 2 DIABETES MELLITUS (HCC): ICD-10-CM

## 2020-08-11 DIAGNOSIS — I25.810 CORONARY ARTERY DISEASE INVOLVING CORONARY BYPASS GRAFT OF NATIVE HEART, ANGINA PRESENCE UNSPECIFIED: ICD-10-CM

## 2020-08-11 DIAGNOSIS — Z96.641 STATUS POST RIGHT HIP REPLACEMENT: ICD-10-CM

## 2020-08-11 DIAGNOSIS — E11.65 CONTROLLED TYPE 2 DIABETES MELLITUS WITH HYPERGLYCEMIA, WITH LONG-TERM CURRENT USE OF INSULIN (HCC): ICD-10-CM

## 2020-08-11 DIAGNOSIS — Z79.4 CONTROLLED TYPE 2 DIABETES MELLITUS WITH HYPERGLYCEMIA, WITH LONG-TERM CURRENT USE OF INSULIN (HCC): ICD-10-CM

## 2020-08-11 DIAGNOSIS — R53.1 GENERALIZED WEAKNESS: ICD-10-CM

## 2020-08-11 PROCEDURE — 99316 NF DSCHRG MGMT 30 MIN+: CPT | Performed by: FAMILY MEDICINE

## 2020-08-11 NOTE — PROGRESS NOTES
Brady Tabor, 7/17/1935, 81year old, female is being discharged from [address] [place] [address] at [address] [facility]    Date of Admission: August 2, 2020    Date of Discharge: August 11, 2020                               Admission fro deferred  RESPIRATORY: Diminished, no crackles, no wheezing, no dyspnea, no cough, O2, 2 L per nasal cannula  CARDIOVASCULAR: RRR, S1 and S2, no murmurs, 1+ in left leg and 2+ at left foot edema  ABDOMEN:  normal active BS+, soft, nondistended; no organome AST 30 08/03/2020    ALT 23 08/03/2020    BILT 0.5 08/03/2020    TP 6.3 (L) 08/03/2020    ALB 2.5 (L) 08/03/2020    GLOBULIN 3.8 08/03/2020     08/06/2020    K 3.9 08/06/2020     08/06/2020    CO2 29.0 08/06/2020         Discharge Diagnoses w/ p.o. daily  Appears managed  Psychiatric professional consult as needed        DVT prophylaxis  Aspirin 325 mg daily for 6 weeks     GI prophylaxis  Pantoprazole 40 mg daily     Labs  CBC, CMP weekly and PRN     Follow Ups:  PCP within 7 days of UI [id number]

## 2020-08-16 NOTE — PROGRESS NOTES
[address] [facility] Author: Yoni Ronquillo MD     1935 MRN [id number]   [facility]  Admission        [facility] Discharge   PCP No primary care provider on file.    [facility]ospital [facility]         Date of Admission: 141-200  -4 units if blood glucose between 201-250  -6 units if blood glucose [id number]  -8 units if blood glucose [id number]  -10 units if blood glucose [id number]  Notify MD if BG <70 mg/dL or BG>400  Insulin Lispro 100 UNIT/ML Subcutaneous daily.  ondansetron 4 MG Oral Tablet Dispersible, Take 4 mg by mouth every 8 (eight) hours as needed for Nausea. No current facility-administered medications on file prior to visit. HISTORY:  She  has no past medical history on file.     She  has decreased range of motion, decreased strength, tenderness and bony tenderness. Lymphadenopathy:      Cervical: No cervical adenopathy. Skin:     General: Skin is warm and dry. Coloration: Skin is not pale. Findings: No erythema or rash.    Jayla calf pain. -improved swelling of lower extremities. -continue with ASA for dvt prophylaxis. Olga Lares needs then following services:  Occupational Therapy  Physical Therapy  Respiratory Therapy  Follow up with PCP and ORTHO as OP follow up.

## 2022-03-30 PROBLEM — M48.062 SPINAL STENOSIS OF LUMBAR REGION WITH NEUROGENIC CLAUDICATION: Status: ACTIVE | Noted: 2022-03-30

## 2022-03-30 PROBLEM — M48.061 FORAMINAL STENOSIS OF LUMBAR REGION: Status: ACTIVE | Noted: 2022-03-30

## 2022-08-23 ENCOUNTER — LAB REQUISITION (OUTPATIENT)
Dept: SURGERY | Age: 87
End: 2022-08-23
Payer: MEDICARE

## 2022-08-23 DIAGNOSIS — Z01.818 PREOP EXAMINATION: ICD-10-CM

## 2022-08-24 LAB — SARS-COV-2 RNA RESP QL NAA+PROBE: NOT DETECTED

## 2025-08-12 ENCOUNTER — NURSING HOME VISIT (OUTPATIENT)
Dept: NEPHROLOGY | Age: 89
End: 2025-08-12

## 2025-08-12 VITALS
WEIGHT: 199 LBS | RESPIRATION RATE: 18 BRPM | HEART RATE: 70 BPM | SYSTOLIC BLOOD PRESSURE: 145 MMHG | HEIGHT: 65 IN | DIASTOLIC BLOOD PRESSURE: 60 MMHG | OXYGEN SATURATION: 95 % | TEMPERATURE: 97 F | BODY MASS INDEX: 33.15 KG/M2

## 2025-08-12 DIAGNOSIS — I10 PRIMARY HYPERTENSION: ICD-10-CM

## 2025-08-12 DIAGNOSIS — D63.1 ANEMIA DUE TO STAGE 3A CHRONIC KIDNEY DISEASE  (CMD): ICD-10-CM

## 2025-08-12 DIAGNOSIS — N18.31 ANEMIA DUE TO STAGE 3A CHRONIC KIDNEY DISEASE  (CMD): ICD-10-CM

## 2025-08-12 DIAGNOSIS — N18.31 STAGE 3A CHRONIC KIDNEY DISEASE  (CMD): Primary | ICD-10-CM
